# Patient Record
Sex: MALE | Race: BLACK OR AFRICAN AMERICAN
[De-identification: names, ages, dates, MRNs, and addresses within clinical notes are randomized per-mention and may not be internally consistent; named-entity substitution may affect disease eponyms.]

---

## 2019-10-01 ENCOUNTER — HOSPITAL ENCOUNTER (EMERGENCY)
Dept: HOSPITAL 97 - ER | Age: 29
Discharge: HOME | End: 2019-10-01
Payer: SELF-PAY

## 2019-10-01 VITALS — OXYGEN SATURATION: 96 % | DIASTOLIC BLOOD PRESSURE: 67 MMHG | SYSTOLIC BLOOD PRESSURE: 117 MMHG

## 2019-10-01 VITALS — TEMPERATURE: 97.7 F

## 2019-10-01 DIAGNOSIS — K52.9: Primary | ICD-10-CM

## 2019-10-01 DIAGNOSIS — F17.210: ICD-10-CM

## 2019-10-01 DIAGNOSIS — G40.909: ICD-10-CM

## 2019-10-01 LAB
ALBUMIN SERPL BCP-MCNC: 4 G/DL (ref 3.4–5)
ALP SERPL-CCNC: 58 U/L (ref 45–117)
ALT SERPL W P-5'-P-CCNC: 33 U/L (ref 12–78)
ANISOCYTOSIS BLD QL: (no result)
AST SERPL W P-5'-P-CCNC: 34 U/L (ref 15–37)
BLD SMEAR INTERP: (no result)
BUN BLD-MCNC: 17 MG/DL (ref 7–18)
GIANT PLATELETS BLD QL SMEAR: SLIGHT
GLUCOSE SERPLBLD-MCNC: 125 MG/DL (ref 74–106)
HCT VFR BLD CALC: 42 % (ref 39.6–49)
LIPASE SERPL-CCNC: 144 U/L (ref 73–393)
LYMPHOCYTES # SPEC AUTO: 0.5 K/UL (ref 0.7–4.9)
MORPHOLOGY BLD-IMP: (no result)
PMV BLD: 8.6 FL (ref 7.6–11.3)
POTASSIUM SERPL-SCNC: 3.5 MMOL/L (ref 3.5–5.1)
RBC # BLD: 5.15 M/UL (ref 4.33–5.43)

## 2019-10-01 PROCEDURE — 80048 BASIC METABOLIC PNL TOTAL CA: CPT

## 2019-10-01 PROCEDURE — 80076 HEPATIC FUNCTION PANEL: CPT

## 2019-10-01 PROCEDURE — 96375 TX/PRO/DX INJ NEW DRUG ADDON: CPT

## 2019-10-01 PROCEDURE — 96361 HYDRATE IV INFUSION ADD-ON: CPT

## 2019-10-01 PROCEDURE — 99284 EMERGENCY DEPT VISIT MOD MDM: CPT

## 2019-10-01 PROCEDURE — 83690 ASSAY OF LIPASE: CPT

## 2019-10-01 PROCEDURE — 36415 COLL VENOUS BLD VENIPUNCTURE: CPT

## 2019-10-01 PROCEDURE — 96374 THER/PROPH/DIAG INJ IV PUSH: CPT

## 2019-10-01 PROCEDURE — 85025 COMPLETE CBC W/AUTO DIFF WBC: CPT

## 2019-10-01 NOTE — ER
Nurse's Notes                                                                                     

 [facility]                                                                 

Name: Javier Griffin                                                                               

Age: 29 yrs                                                                                       

Sex: Male                                                                                         

: 1990                                                                                   

MRN: [id number]                                                                                   

Arrival Date: 10/01/2019                                                                          

Time: 06:44                                                                                       

Account#: [id number]                                                                            

Bed 5                                                                                             

Private MD:                                                                                       

Diagnosis: Generalized abdominal pain;Gastroenteritis                                             

                                                                                                  

Presentation:                                                                                     

10/01                                                                                             

06:47 Presenting complaint: Patient states: vomiting and Diarrhea since  last night.      ak1 

      Transition of care: patient was not received from another setting of care. Onset of         

      symptoms was 2019. Risk Assessment: Do you want to hurt yourself or           

      someone else? Patient reports no desire to harm self or others. Initial Sepsis Screen:      

      Does the patient meet any 2 criteria? No. Patient's initial sepsis screen is negative.      

      Does the patient have a suspected source of infection? No. Patient's initial sepsis         

      screen is negative. Care prior to arrival: None.                                            

06:47 Method Of Arrival: EMS: [facility]                                                       ak1 

06:47 Acuity: PATRICIA 3                                                                           ak1 

                                                                                                  

Triage Assessment:                                                                                

06:48 General: Appears in no apparent distress. Behavior is drowsy. Pain: Complains of pain   ak1 

      in abdomen. GI: Reports diarrhea, nausea, vomiting.                                         

06:54 GI: Pt is actively vomiting.                                                            lp1 

                                                                                                  

Historical:                                                                                       

- Allergies:                                                                                      

06:48 No Known Allergies;                                                                     ak1 

- Home Meds:                                                                                      

06:48 Depakote Oral [Active];                                                                 ak1 

- PMHx:                                                                                           

06:48 Seizures; Asthma;                                                                       ak1 

- PSHx:                                                                                           

06:48 None;                                                                                   ak1 

                                                                                                  

- Immunization history:: Adult Immunizations unknown.                                             

- Social history:: Smoking status: Patient uses tobacco products, smokes one-half pack            

  cigarettes per day, Patient uses alcohol, Patient/guardian denies using street drugs.           

- Ebola Screening: : No symptoms or risks identified at this time.                                

                                                                                                  

                                                                                                  

Screenin:49 Abuse screen: Denies threats or abuse. Denies injuries from another. Nutritional        ak1 

      screening: No deficits noted. Tuberculosis screening: No symptoms or risk factors           

      identified. Fall Risk None identified.                                                      

                                                                                                  

Assessment:                                                                                       

07:18 General: Appears in no apparent distress. Behavior is calm, appropriate for age. Pain:  tw2 

      Complains of pain in abdomen. Neuro: Level of Consciousness is awake, alert, obeys          

      commands, Oriented to person, place, time, situation. Cardiovascular: Heart tones S1 S2     

      Patient's skin is warm and dry. Respiratory: Airway is patent Respiratory effort is         

      even, unlabored, Respiratory pattern is regular, symmetrical, Breath sounds are clear       

      bilaterally. GI: Abdomen is flat, Bowel sounds present X 4 quads. Reports nausea,           

      vomiting. : No signs and/or symptoms were reported regarding the genitourinary            

      system. EENT: No signs and/or symptoms were reported regarding the EENT system. Derm:       

      No signs and/or symptoms reported regarding the dermatologic system. Musculoskeletal:       

      Range of motion: intact in all extremities.                                                 

08:42 Reassessment: Patient appears in no apparent distress at this time. No changes from     tw2 

      previously documented assessment. Patient and/or family updated on plan of care and         

      expected duration. Pain level reassessed. Patient is alert, oriented x 3, equal             

      unlabored respirations, skin warm/dry/pink. Patient states symptoms have improved.          

                                                                                                  

Vital Signs:                                                                                      

06:46  / 64; Pulse 54; Resp 16; Temp 97.7(TE); Pulse Ox 97% on R/A; Weight 79.38 kg     ak1 

      (R); Height 5 ft. 9 in. (175.26 cm) (R); Pain 4/10;                                         

07:39  / 74; Pulse 57; Resp 17; Pulse Ox 99% on R/A;                                    tw2 

08:42  / 67; Pulse 58; Resp 17; Pulse Ox 96% on R/A;                                    tw2 

06:46 Body Mass Index 25.84 (79.38 kg, 175.26 cm)                                             ak1 

                                                                                                  

ED Course:                                                                                        

06:44 Patient arrived in ED.                                                                  ds1 

06:46 Arm band placed on Patient placed in an exam room, on a stretcher, on pulse oximetry,   ak1 

      Patient notified of wait time.                                                              

06:47 Triage completed.                                                                       ak1 

06:48 Luther Rios PA is PHCP.                                                               [id number] 

06:48 Rafael Capps MD is Attending Physician.                                             [id number] 

06:49 Patient has correct armband on for positive identification. Bed in low position. Call   ak1 

      light in reach. Side rails up X2. Pulse ox on. NIBP on.                                     

06:50 Inserted saline lock: 20 gauge in right antecubital area, using aseptic technique.      lp1 

      Blood collected.                                                                            

07:06 Tanisha Dick RN is Primary Nurse.                                                        tw2 

07:13 Inserted saline lock: 22 gauge in right forearm, using aseptic technique. Blood         tw2 

      collected.                                                                                  

08:42 No provider procedures requiring assistance completed.                                  tw2 

08:47 IV discontinued, intact, bleeding controlled, No redness/swelling at site. Pressure     tw2 

      dressing applied, x2.                                                                       

                                                                                                  

Administered Medications:                                                                         

06:57 Drug: Zofran 4 mg Route: IVP; Site: right antecubital;                                  lp1 

07:45 Follow up: Response: No adverse reaction; Nausea unchanged                              tw2 

06:58 Drug: NS 0.9% 1000 ml Route: IV; Rate: 1000 ml; Site: right antecubital;                lp1 

07:55 Follow up: Response: No adverse reaction; IV Status: Completed infusion; IV Intake:     tw2 

      1000ml                                                                                      

07:47 Drug: Phenergan 12.5 mg Route: IVP; Site: right antecubital;                            tw2 

08:45 Follow up: Response: No adverse reaction                                                tw2 

08:23 Drug: fentaNYL (PF) 50 mcg Route: IVP; Site: right antecubital;                         sg  

08:45 Follow up: Response: No adverse reaction; Pain is decreased; RASS: Drowsy (-1)          tw2 

                                                                                                  

                                                                                                  

Intake:                                                                                           

07:55 IV: 1000ml; Total: 1000ml.                                                              tw2 

                                                                                                  

Outcome:                                                                                          

08:38 Discharge ordered by MD.                                                                judy 

08:47 Discharged to home ambulatory, with family.                                             tw2 

08:47 Condition: stable                                                                           

08:47 Discharge instructions given to patient, family, Instructed on discharge instructions,      

      follow up and referral plans. no drinking with medication, no driving heavy equipment,      

      medication usage, Demonstrated understanding of instructions, follow-up care,               

      medications, Prescriptions given X 2.                                                       

08:47 Patient left the ED.                                                                    tw2 

                                                                                                  

Signatures:                                                                                       

Steve Moreira RN                         Dee Jay                                ds1                                                  

Hallie Milan RN                         RN   lp1                                                  

Luther Rios PA PA   jr8                                                  

Pretty Mathews RN                       RN   ak1                                                  

Tanisha Dick RN                          RN   tw2                                                  

                                                                                                  

**************************************************************************************************

## 2022-05-13 ENCOUNTER — HOSPITAL ENCOUNTER (EMERGENCY)
Dept: HOSPITAL 97 - ER | Age: 32
Discharge: HOME | End: 2022-05-13
Payer: SELF-PAY

## 2022-05-13 DIAGNOSIS — D64.9: ICD-10-CM

## 2022-05-13 DIAGNOSIS — Z71.1: Primary | ICD-10-CM

## 2022-05-13 DIAGNOSIS — F17.210: ICD-10-CM

## 2022-05-13 DIAGNOSIS — F31.9: ICD-10-CM

## 2022-05-13 LAB
ALBUMIN SERPL BCP-MCNC: 3.4 G/DL (ref 3.4–5)
ALP SERPL-CCNC: 56 U/L (ref 45–117)
ALT SERPL W P-5'-P-CCNC: 20 U/L (ref 12–78)
AST SERPL W P-5'-P-CCNC: 19 U/L (ref 15–37)
BUN BLD-MCNC: 11 MG/DL (ref 7–18)
GLUCOSE SERPLBLD-MCNC: 106 MG/DL (ref 74–106)
HCT VFR BLD CALC: 38.9 % (ref 39.6–49)
LYMPHOCYTES # SPEC AUTO: 2.3 K/UL (ref 0.7–4.9)
PMV BLD: 7.6 FL (ref 7.6–11.3)
POTASSIUM SERPL-SCNC: 3.9 MMOL/L (ref 3.5–5.1)
RBC # BLD: 4.7 M/UL (ref 4.33–5.43)

## 2022-05-13 PROCEDURE — 85025 COMPLETE CBC W/AUTO DIFF WBC: CPT

## 2022-05-13 PROCEDURE — 80053 COMPREHEN METABOLIC PANEL: CPT

## 2022-05-13 PROCEDURE — 36415 COLL VENOUS BLD VENIPUNCTURE: CPT

## 2022-05-13 PROCEDURE — 99283 EMERGENCY DEPT VISIT LOW MDM: CPT

## 2022-05-13 NOTE — EDPHYS
Physician Documentation                                                                           

 [facility]                                                                 

Name: Javier Griffin                                                                               

Age: 32 yrs                                                                                       

Sex: Male                                                                                         

: 1990                                                                                   

MRN: [id number]                                                                                   

Arrival Date: 2022                                                                          

Time: 20:34                                                                                       

Account#: [id number]                                                                            

Bed 16                                                                                            

Private MD:                                                                                       

ED Physician Hernan Christianson                                                                         

HPI:                                                                                              

                                                                                             

22:10 This 32 yrs old Black Male presents to ER via Ambulatory with complaints of Eye         ms3 

      Problem, Yellowing of eyes.                                                                 

22:10 The patient is experiencing yellow eyes. Onset: The symptoms/episode began/occurred     ms3 

      today. Duration: the symptoms are continuous. Aggravated by nothing. Alleviated by          

      nothing. Associated signs and symptoms: Pertinent negatives: chills, dizziness, fever,      

      headache.                                                                                   

                                                                                                  

Historical:                                                                                       

- Allergies:                                                                                      

20:57 No Known Allergies;                                                                     vc1 

- Home Meds:                                                                                      

20:57 Depakote Oral [Active]; Trazodone Oral [Active]; Wellbutrin Oral [Active]; Risperdal    vc1 

      0.25 mg Oral tab 2 tabs 2 times per day [Active]; Seroquel 25 mg Oral tab [Active];         

- PMHx:                                                                                           

20:57 Asthma; Seizures; Bipolar disorder;                                                     vc1 

- PSHx:                                                                                           

20:57 None;                                                                                   vc1 

                                                                                                  

- Immunization history:: Adult Immunizations up to date.                                          

- Social history:: Smoking status: Patient reports the use of cigarette tobacco                   

  products, smokes one-half pack cigarettes per day, Patient uses alcohol, on a daily             

  basis.                                                                                          

                                                                                                  

                                                                                                  

ROS:                                                                                              

22:10 Constitutional: Negative for fever, and chills. Neck: Negative for injury, pain, and    ms3 

      swelling, Cardiovascular: Negative for chest pain, and palpitations. Respiratory:           

      Negative for shortness of breath, cough, wheezing, and pleuritic chest pain,                

      Abdomen/GI: Negative for abdominal pain, nausea, vomiting, diarrhea, and constipation,      

      MS/Extremity: Negative for injury and deformity, Skin: Negative for injury, rash, and       

      discoloration, Psych: Negative for depression, anxiety, suicide ideation, homicidal         

      ideation, and hallucinations.                                                               

22:10 Eyes: Positive for icterus.                                                             ms3 

                                                                                                  

Exam:                                                                                             

22:10 Constitutional:  This is a well developed, well nourished patient who is awake, alert,  ms3 

      and in no acute distress. Head/Face:  Normocephalic, atraumatic. Eyes:  Pupils equal        

      round and reactive to light, extra-ocular motions intact.  Lids and lashes normal.          

      Conjunctiva and sclera are non-icteric and not injected.  Periorbital areas with no         

      swelling, redness, or edema. Neck:  Trachea midline, no cervical lymphadenopathy.           

      Supple, full range of motion without nuchal rigidity, or vertebral point tenderness.        

      No Meningismus. Chest/axilla:  Normal chest wall appearance and motion.  Nontender with     

      no deformity.   Cardiovascular:  Regular rate and rhythm with a normal S1 and S2.  No       

      gallops, murmurs, or rubs.  Normal PMI, no JVD.  No pulse deficits. Respiratory:  Lungs     

      have equal breath sounds bilaterally, clear to auscultation and percussion.  No rales,      

      rhonchi or wheezes noted.  No increased work of breathing, no retractions or nasal          

      flaring. Abdomen/GI:  Soft, non-tender, with normal bowel sounds.  No distension or         

      tympany.  No guarding or rebound.  No evidence of tenderness throughout. Skin:  Warm,       

      dry with normal turgor.  Normal color with no rashes, no lesions, and no evidence of        

      cellulitis. Psych:  Awake, alert, with orientation to person, place and time.               

      Behavior, mood, and affect are within normal limits.                                        

                                                                                                  

Vital Signs:                                                                                      

20:59  / 81; Pulse 81; Resp 16; Temp 98.8; Pulse Ox 99% ; Weight 65.77 kg; Height 5 ft. vc1 

      9 in. (175.26 cm); Pain 10/10;                                                              

20:59 Body Mass Index 21.41 (65.77 kg, 175.26 cm)                                             vc1 

                                                                                                  

MDM:                                                                                              

21:22 Patient medically screened.                                                             ms3 

22:10 Differential diagnosis: hepatitis vs bilirubinemia. Data reviewed: vital signs, nurses  ms3 

      notes, lab test result(s). Counseling: I had a detailed discussion with the patient         

      and/or guardian regarding: the historical points, exam findings, and any diagnostic         

      results supporting the discharge/admit diagnosis, lab results, the need for outpatient      

      follow up, to return to the emergency department if symptoms worsen or persist or if        

      there are any questions or concerns that arise at home.                                     

                                                                                                  

                                                                                             

20:53 Order name: CBC with Diff; Complete Time: 22:07                                         ms3 

                                                                                             

20:53 Order name: CMP; Complete Time: 22:07                                                   ms3 

                                                                                                  

Administered Medications:                                                                         

No medications were administered                                                                  

                                                                                                  

                                                                                                  

Disposition Summary:                                                                              

22 22:09                                                                                    

Discharge Ordered                                                                                 

      Location: Home                                                                          ms3 

      Condition: Stable                                                                       ms3 

      Diagnosis                                                                                   

        - Person with feared health complaint in whom no diagnosis is made                    ms3 

        - Anemia, unspecified                                                                 ms3 

      Followup:                                                                               ms3 

        - With: Catarino Martins MD                                                                 

        - When: 2 - 3 days                                                                         

        - Reason: Recheck today's complaints                                                       

      Followup:                                                                               ms3 

        - With: Sen Ramos MD                                                                

        - When: 2 - 3 days                                                                         

        - Reason: Recheck today's complaints                                                       

      Discharge Instructions:                                                                     

        - Discharge Summary Sheet                                                             ms3 

        - Anemia                                                                              ms3 

      Forms:                                                                                      

        - Medication Reconciliation Form                                                      ms3 

        - Thank You Letter                                                                    ms3 

        - Antibiotic Education                                                                ms3 

        - Prescription Opioid Use                                                             ms3 

Signatures:                                                                                       

Dispatcher [employer]                           EDMS                                                 

Sarath Almeida, KATIEP-C                      FNP-Cla1                                                  

Hernan Christianson DO                        DO   ms3                                                  

Keesha Amin, RN                    RN   vc1                                                  

                                                                                                  

Corrections: (The following items were deleted from the chart)                                    

22:12 22:10 Constitutional: Negative for fever, and chills. Eyes: Negative for injury, pain,  ms3 

      redness, and discharge, Neck: Negative for injury, pain, and swelling, Cardiovascular:      

      Negative for chest pain, and palpitations. Respiratory: Negative for shortness of           

      breath, cough, wheezing, and pleuritic chest pain, Abdomen/GI: Negative for abdominal       

      pain, nausea, vomiting, diarrhea, and constipation, MS/Extremity: Negative for injury       

      and deformity, Skin: Negative for injury, rash, and discoloration, Psych: Negative for      

      depression, anxiety, suicide ideation, homicidal ideation, and hallucinations, ms3          

                                                                                                  

**************************************************************************************************

## 2022-05-13 NOTE — ER
Nurse's Notes                                                                                     

 [facility]                                                                 

Name: Javier Griffin                                                                               

Age: 32 yrs                                                                                       

Sex: Male                                                                                         

: 1990                                                                                   

MRN: [id number]                                                                                   

Arrival Date: 2022                                                                          

Time: 20:34                                                                                       

Account#: [id number]                                                                            

Bed 16                                                                                            

Private MD:                                                                                       

Diagnosis: Person with feared health complaint in whom no diagnosis is made;Anemia, unspecified   

                                                                                                  

Presentation:                                                                                     

                                                                                             

20:55 Chief complaint: Patient states: "My eyes are yellow, I've been loosing my hair, I have vc1 

      had stomach pain and coughing up blood last week with a nose bleed two nights ago.          

      Coronavirus screen: Vaccine status: Patient reports being unvaccinated. Ebola Screen:       

      No symptoms or risks identified at this time. Risk Assessment: Do you want to hurt          

      yourself or someone else? Patient reports no desire to harm self or others. Onset of        

      symptoms is unknown.                                                                        

20:55 Method Of Arrival: Ambulatory                                                           vc1 

20:55 Acuity: PATRICIA 3                                                                           vc1 

                                                                                                  

Historical:                                                                                       

- Allergies:                                                                                      

20:57 No Known Allergies;                                                                     vc1 

- Home Meds:                                                                                      

20:57 Depakote Oral [Active]; Trazodone Oral [Active]; Wellbutrin Oral [Active]; Risperdal    vc1 

      0.25 mg Oral tab 2 tabs 2 times per day [Active]; Seroquel 25 mg Oral tab [Active];         

- PMHx:                                                                                           

20:57 Asthma; Seizures; Bipolar disorder;                                                     vc1 

- PSHx:                                                                                           

20:57 None;                                                                                   vc1 

                                                                                                  

- Immunization history:: Adult Immunizations up to date.                                          

- Social history:: Smoking status: Patient reports the use of cigarette tobacco                   

  products, smokes one-half pack cigarettes per day, Patient uses alcohol, on a daily             

  basis.                                                                                          

                                                                                                  

                                                                                                  

Screenin:15 Abuse screen: Denies threats or abuse. Nutritional screening: No deficits noted.        jb4 

      Tuberculosis screening: No symptoms or risk factors identified. Fall Risk None              

      identified.                                                                                 

                                                                                                  

Assessment:                                                                                       

21:15 General: Appears in no apparent distress. comfortable, Behavior is calm, cooperative,   jb4 

      appropriate for age. Pain: Denies pain. Neuro: Level of Consciousness is awake, alert,      

      obeys commands, Oriented to person, place, time, situation. Cardiovascular: Patient's       

      skin is warm and dry. Respiratory: Airway is patent Respiratory effort is even,             

      unlabored, Respiratory pattern is regular, symmetrical. GI: No signs and/or symptoms        

      were reported involving the gastrointestinal system. : No signs and/or symptoms were      

      reported regarding the genitourinary system. EENT: Sclera/Cornea Jaundice discoloration     

      noted to both eyes. Derm: Skin is intact, Skin is dry, Skin is normal, Skin temperature     

      is warm. Musculoskeletal: Circulation, motion, and sensation intact. Range of motion:       

      intact in all extremities.                                                                  

22:11 Reassessment: Patient appears in no apparent distress at this time. Patient and/or      jb4 

      family updated on plan of care and expected duration. Pain level reassessed. Patient is     

      alert, oriented x 3, equal unlabored respirations, skin warm/dry/pink.                      

22:39 Reassessment: Patient appears in no apparent distress at this time. Patient and/or      jb4 

      family updated on plan of care and expected duration. Pain level reassessed. Patient is     

      alert, oriented x 3, equal unlabored respirations, skin warm/dry/pink.                      

                                                                                                  

Vital Signs:                                                                                      

20:59  / 81; Pulse 81; Resp 16; Temp 98.8; Pulse Ox 99% ; Weight 65.77 kg; Height 5 ft. vc1 

      9 in. (175.26 cm); Pain 10/10;                                                              

20:59 Body Mass Index 21.41 (65.77 kg, 175.26 cm)                                             vc1 

                                                                                                  

ED Course:                                                                                        

20:34 Patient arrived in ED.                                                                  bp1 

20:41 Hernan Christianson DO is Attending Physician.                                                ms3 

20:57 Triage completed.                                                                       vc1 

20:57 Arm band placed on left wrist.                                                          vc1 

21:15 Patient has correct armband on for positive identification. Bed in low position. Call   jb4 

      light in reach. Side rails up X 1. Client placed on continuous cardiac and pulse            

      oximetry monitoring. NIBP monitoring applied.                                               

21:29 Initial lab(s) drawn, by me, sent to lab. Inserted saline lock: 18 gauge in left        jb4 

      forearm, using aseptic technique. Blood collected.                                          

22:06 Salvador Arias, RN is Primary Nurse.                                                     jb4 

22:08 Catarino Martins MD is Referral Physician.                                               ms3 

22:09 Sen Ramos MD is Referral Physician.                                              ms3 

22:12 No provider procedures requiring assistance completed. IV discontinued, intact,         jb4 

      bleeding controlled, No redness/swelling at site. Pressure dressing applied.                

                                                                                                  

Administered Medications:                                                                         

No medications were administered                                                                  

                                                                                                  

                                                                                                  

Outcome:                                                                                          

22:09 Discharge ordered by MD.                                                                ms3 

22:39 Discharged to home ambulatory.                                                          jb4 

22:39 Condition: stable                                                                           

22:39 Discharge instructions given to patient, Instructed on discharge instructions, follow       

      up and referral plans. Demonstrated understanding of instructions, follow-up care.          

22:40 Patient left the ED.                                                                    jb4 

                                                                                                  

Signatures:                                                                                       

Salvador Arias, RN                       RN   jb4                                                  

Hernan Christianson DO DO   ms3                                                  

Prachi Hanna Vanessa, RN                    RN   vc1                                                  

                                                                                                  

**************************************************************************************************

## 2022-05-14 VITALS — OXYGEN SATURATION: 99 % | SYSTOLIC BLOOD PRESSURE: 123 MMHG | DIASTOLIC BLOOD PRESSURE: 81 MMHG | TEMPERATURE: 98.8 F

## 2022-05-23 ENCOUNTER — HOSPITAL ENCOUNTER (EMERGENCY)
Dept: HOSPITAL 97 - ER | Age: 32
Discharge: HOME | End: 2022-05-23
Payer: SELF-PAY

## 2022-05-23 VITALS — TEMPERATURE: 99 F | SYSTOLIC BLOOD PRESSURE: 130 MMHG | DIASTOLIC BLOOD PRESSURE: 69 MMHG | OXYGEN SATURATION: 100 %

## 2022-05-23 DIAGNOSIS — F60.9: Primary | ICD-10-CM

## 2022-05-23 DIAGNOSIS — F17.210: ICD-10-CM

## 2022-05-23 PROCEDURE — 99283 EMERGENCY DEPT VISIT LOW MDM: CPT

## 2022-05-23 NOTE — EDPHYS
Physician Documentation                                                                           

 [facility]                                                                 

Name: Javier Griffin                                                                               

Age: 32 yrs                                                                                       

Sex: Male                                                                                         

: 1990                                                                                   

MRN: [id number]                                                                                   

Arrival Date: 2022                                                                          

Time: 18:47                                                                                       

Account#: [id number]                                                                            

Bed 13                                                                                            

Private MD:                                                                                       

ED Physician Luciano Cedeno                                                                       

HPI:                                                                                              

                                                                                             

19:52 This 32 yrs old Black Male presents to ER via Ambulatory with complaints of Suicidal    kdr 

      Ideation, Neck Pain, <24hrs Old.                                                            

19:52 The patient presents to the emergency department with Attention seeking behavior.       kdr 

      Onset: The symptoms/episode began/occurred at an unknown time. This is an ongoing           

      problem patient states he is not acutely suicidal or homicidal. He states that he           

      exhibits attention seeking behavior from time to time. He denies wanting to leave his 2     

      wonderful children that he takes care of. He realizes that he has to be there for them.     

      He stated that he did have a serious suicidal thoughts and attempt about 5 years ago        

      been speaking with police at that time he is at peace with that circumstance and is         

      now, other than participating in attention seeking behavior, not a threat to himself or     

      others. The police officers were present during my interview with the patient and they      

      conducted their own interview as well. Conclusion of our discussion was that he was not     

      acutely suicidal or a threat to others.. Past psychiatric history: Prior diagnosis:         

      bipolar disorder. Associated signs and symptoms: The patient has no apparent associated     

      signs or symptoms. Severity of symptoms: At their worst the symptoms were very mild in      

      the emergency department the symptoms are unchanged have resolved. The patient has          

      experienced similar episodes in the past, chronically. The patient has not recently         

      seen a physician.                                                                           

                                                                                                  

Historical:                                                                                       

- Allergies:                                                                                      

18:57 No Known Allergies;                                                                     jd3 

- Home Meds:                                                                                      

18:57 Depakote Oral [Active]; Risperdal 0.25 mg Oral tab 2 tabs 2 times per day [Active];     jd3 

      Seroquel 25 mg Oral tab [Active]; Trazodone Oral [Active]; Wellbutrin Oral [Active];        

- PMHx:                                                                                           

18:57 Asthma; Bipolar disorder; Seizures;                                                     jd3 

                                                                                                  

- Immunization history:: Adult Immunizations up to date, Client reports having NOT                

  received the Covid vaccine. Flu vaccine status is unknown.                                      

- Social history:: Smoking status: Patient reports the use of cigarette tobacco                   

  products, smokes one-half pack cigarettes per day.                                              

                                                                                                  

                                                                                                  

ROS:                                                                                              

19:52 Constitutional: Negative for fever, chills, and weight loss, Eyes: Negative for injury, kdr 

      pain, redness, and discharge, ENT: Negative for injury, pain, and discharge, Neck:          

      Negative for injury, pain, and swelling, Cardiovascular: Negative for chest pain,           

      palpitations, and edema, Respiratory: Negative for shortness of breath, cough,              

      wheezing, and pleuritic chest pain, Abdomen/GI: Negative for abdominal pain, nausea,        

      vomiting, diarrhea, and constipation, Back: Negative for injury and pain, : Negative      

      for injury, bleeding, discharge, and swelling, MS/Extremity: Negative for injury and        

      deformity, Skin: Negative for injury, rash, and discoloration, Neuro: Negative for          

      headache, weakness, numbness, tingling, and seizure activity. Allergy/Immunology:           

      Negative for hives, rash, and allergies, Endocrine: Negative for neck swelling,             

      polydipsia, polyuria, polyphagia, and marked weight changes, Hematologic/Lymphatic:         

      Negative for swollen nodes, abnormal bleeding, and unusual bruising.                        

19:52 Psych: Positive for Bipolar disorder.                                                       

                                                                                                  

Exam:                                                                                             

19:52 Constitutional:  This is a well developed, well nourished patient who is awake, alert,  kdr 

      and in no acute distress. Head/Face:  Normocephalic, atraumatic. Eyes:  Pupils equal        

      round and reactive to light, extra-ocular motions intact.  Lids and lashes normal.          

      Conjunctiva and sclera are non-icteric and not injected.  Cornea within normal limits.      

      Periorbital areas with no swelling, redness, or edema. Neck:  Trachea midline, no           

      thyromegaly or masses palpated, and no cervical lymphadenopathy.  Supple, full range of     

      motion without nuchal rigidity, or vertebral point tenderness.  No Meningismus.             

      Chest/axilla:  Normal chest wall appearance and motion.  Nontender with no deformity.       

      No lesions are appreciated. Cardiovascular:  Regular rate and rhythm with a normal S1       

      and S2.  No gallops, murmurs, or rubs.  Normal PMI, no JVD.  No pulse deficits.             

      Respiratory:  Lungs have equal breath sounds bilaterally, clear to auscultation and         

      percussion.  No rales, rhonchi or wheezes noted.  No increased work of breathing, no        

      retractions or nasal flaring. Abdomen/GI:  Soft, non-tender, with normal bowel sounds.      

      No distension or tympany.  No guarding or rebound.  No evidence of tenderness               

      throughout. Back:  No spinal tenderness.  No costovertebral tenderness.  Full range of      

      motion. Skin:  Warm, dry with normal turgor.  Normal color with no rashes, no lesions,      

      and no evidence of cellulitis. MS/ Extremity:  Pulses equal, no cyanosis.                   

      Neurovascular intact.  Full, normal range of motion. Neuro:  Awake and alert, GCS 15,       

      oriented to person, place, time, and situation.  Cranial nerves II-XII grossly intact.      

      Motor strength 5/5 in all extremities.  Sensory grossly intact.  Cerebellar exam            

      normal.  Normal gait.                                                                       

19:52 Psych: Behavior/mood is pleasant, cooperative, Affect is calm, Oriented to person,          

      place, time, Patient has no thoughts/intents to harm self or others. Judgement /            

      Insight is normal. Delusions/hallucinations are not present.                                

                                                                                                  

Vital Signs:                                                                                      

18:58  / 69; Pulse 83; Resp 19 S; Temp 99.0(TE); Pulse Ox 100% on R/A; Weight 74.84 kg  jd3 

      (R); Height 5 ft. 9 in. (175.26 cm) (R); Pain 8/10;                                         

19:59  / 70; Pulse 86; Resp 18; Pulse Ox 100% on R/A;                                   ld1 

18:58 Body Mass Index 24.37 (74.84 kg, 175.26 cm)                                             jd3 

                                                                                                  

MDM:                                                                                              

18:59 Patient medically screened.                                                             ms3 

19:52 Data reviewed: vital signs, nurses notes, lab test result(s), radiologic studies.       kdr 

      Counseling: I had a detailed discussion with the patient and/or guardian regarding: the     

      historical points, exam findings, and any diagnostic results supporting the                 

      discharge/admit diagnosis, the need for outpatient follow up.                               

                                                                                                  

Administered Medications:                                                                         

No medications were administered                                                                  

                                                                                                  

                                                                                                  

Disposition:                                                                                      

19:21 Patient medically screened. Labs and CTA neck placed awaiting Dr Cedeno. Patient       ms3 

      attempted to leave and  police department called..                                        

                                                                                                  

Disposition Summary:                                                                              

22 19:50                                                                                    

Discharge Ordered                                                                                 

      Location: Home                                                                          kdr 

      Problem: an acute exacerbation                                                          kdr 

      Symptoms: are resolved                                                                  kdr 

      Condition: Stable                                                                       kdr 

      Diagnosis                                                                                   

        - Personality disorder, unspecified                                                   kdr 

      Followup:                                                                               kdr 

        - With: Private Physician                                                                  

        - When: 2 - 3 days                                                                         

        - Reason: If symptoms return, Further diagnostic work-up, Recheck today's complaints,      

      Continuance of care, Re-evaluation by your physician                                        

      Discharge Instructions:                                                                     

        - Discharge Summary Sheet                                                             kdr 

        - Managing Borderline Personality Disorder                                            kdr 

      Forms:                                                                                      

        - Medication Reconciliation Form                                                      kdr 

        - Thank You Letter                                                                    kdr 

Signatures:                                                                                       

Dispatcher [employer]                           Luciano Mora MD MD   kdr                                                  

Serjio Black RN                    RN   jd3                                                  

Hernan Christianson DO                        DO   ms3                                                  

                                                                                                  

Corrections: (The following items were deleted from the chart)                                    

19:58 18:58 EKG - Nurse/Tech ordered. ms3                                                     ld1 

19:58 18:58 IV Saline Lock ordered. ms3                                                       ld1 

19:58 18:58 Labs collected and sent ordered. ms3                                              ld1 

19:58 18:58 Oxygen Per Protocol ordered. ms3                                                  ld1 

19:58 18:58 O2 Sat Monitoring ordered. ms3                                                    ld1 

19:58 18:58 Suicide Screening ([place]) ordered. ms3                                         ld1 

19:58 18:58 Urine Dipstick-Ancillary ordered. ms3                                             ld1 

                                                                                                  

**************************************************************************************************

## 2022-05-23 NOTE — ER
Nurse's Notes                                                                                     

 [facility]                                                                 

Name: Javier Griffin                                                                               

Age: 32 yrs                                                                                       

Sex: Male                                                                                         

: 1990                                                                                   

MRN: [id number]                                                                                   

Arrival Date: 2022                                                                          

Time: 18:47                                                                                       

Account#: [id number]                                                                            

Bed 13                                                                                            

Private MD:                                                                                       

Diagnosis: Personality disorder, unspecified                                                      

                                                                                                  

Presentation:                                                                                     

                                                                                             

18:54 Chief complaint: Patient states: "I tried hanging myself today about 20 min ago. I am   jd3 

      glad it didn't work, but now that I am down from the tree my neck and back are hurting      

      pretty bad.". Coronavirus screen: At this time, the client does not indicate any            

      symptoms associated with coronavirus-19. Ebola Screen: No symptoms or risks identified      

      at this time. Initial Sepsis Screen: Does the patient meet any 2 criteria? No.              

      Patient's initial sepsis screen is negative. Does the patient have a suspected source       

      of infection? No. Patient's initial sepsis screen is negative. Risk Assessment: Do you      

      want to hurt yourself or someone else? Patient reports desire/thoughts of hurting           

      themselves or someone else. Provider notified. Onset of symptoms was May 23, 2022.          

18:54 Method Of Arrival: Ambulatory                                                           jd3 

18:54 Acuity: PATRICIA 2                                                                           jd3 

                                                                                                  

Historical:                                                                                       

- Allergies:                                                                                      

18:57 No Known Allergies;                                                                     jd3 

- Home Meds:                                                                                      

18:57 Depakote Oral [Active]; Risperdal 0.25 mg Oral tab 2 tabs 2 times per day [Active];     jd3 

      Seroquel 25 mg Oral tab [Active]; Trazodone Oral [Active]; Wellbutrin Oral [Active];        

- PMHx:                                                                                           

18:57 Asthma; Bipolar disorder; Seizures;                                                     jd3 

                                                                                                  

- Immunization history:: Adult Immunizations up to date, Client reports having NOT                

  received the Covid vaccine. Flu vaccine status is unknown.                                      

- Social history:: Smoking status: Patient reports the use of cigarette tobacco                   

  products, smokes one-half pack cigarettes per day.                                              

                                                                                                  

                                                                                                  

Screenin:59 Abuse screen: Denies threats or abuse. Denies injuries from another. Nutritional        ld1 

      screening: No deficits noted. Tuberculosis screening: No symptoms or risk factors           

      identified. Fall Risk None identified.                                                      

                                                                                                  

Assessment:                                                                                       

19:57 Reassessment: PT refusing to all testing. Denies SI. ERP and PD at bedside discussing   ld1 

      POC.                                                                                        

19:59 General: Appears in no apparent distress. comfortable, Behavior is calm, cooperative,   ld1 

      appropriate for age. Pain: Denies pain. Neuro: Level of Consciousness is awake, alert,      

      obeys commands, Oriented to person, place, time, situation. Cardiovascular: Capillary       

      refill < 3 seconds Patient's skin is warm and dry. Respiratory: Airway is patent            

      Respiratory effort is even, unlabored, Respiratory pattern is regular, symmetrical. GI:     

      Abdomen is flat, non-distended. : No signs and/or symptoms were reported regarding        

      the genitourinary system. EENT: No signs and/or symptoms were reported regarding the        

      EENT system. Derm: No signs and/or symptoms reported regarding the dermatologic system.     

      Musculoskeletal: No signs and/or symptoms reported regarding the musculoskeletal system.    

                                                                                                  

Vital Signs:                                                                                      

18:58  / 69; Pulse 83; Resp 19 S; Temp 99.0(TE); Pulse Ox 100% on R/A; Weight 74.84 kg  jd3 

      (R); Height 5 ft. 9 in. (175.26 cm) (R); Pain 8/10;                                         

19:59  / 70; Pulse 86; Resp 18; Pulse Ox 100% on R/A;                                   ld1 

18:58 Body Mass Index 24.37 (74.84 kg, 175.26 cm)                                             jd3 

                                                                                                  

ED Course:                                                                                        

18:47 Patient arrived in ED.                                                                  as  

18:57 Triage completed.                                                                       jd3 

18:57 Hernan Christianson DO is Attending Physician.                                                ms3 

18:58 Arm band placed on.                                                                     jd3 

19:18 Attending Physician role handed off by Hernan Christianson DO                                 kdr 

19:18 Luciano Cedeno MD is Attending Physician.                                              kdr 

19:19 Safety checks: Items removed: yes. Door open/sign placed on door: yes. Family/friend    mh5 

      present: no. Sitter present: Yes.                                                           

19:57 Teagan Monterroso, CRISTOBAL is Primary Nurse.                                                   ld1 

19:59 Patient has correct armband on for positive identification. Pulse ox on. NIBP on.       ld1 

19:59 No provider procedures requiring assistance completed. Patient did not have IV access   ld1 

      during this emergency room visit.                                                           

                                                                                                  

Administered Medications:                                                                         

No medications were administered                                                                  

                                                                                                  

                                                                                                  

Medication:                                                                                       

19:59 VIS not applicable for this client.                                                     ld1 

                                                                                                  

Outcome:                                                                                          

19:50 Discharge ordered by MD.                                                                kdr 

19:59 Patient left the ED.                                                                    ld1 

19:59 Discharged to home ambulatory.                                                          ld1 

19:59 Condition: stable                                                                           

19:59 Discharge instructions given to patient, Instructed on discharge instructions, follow       

      up and referral plans. Demonstrated understanding of instructions, follow-up care.          

                                                                                                  

Signatures:                                                                                       

Luciano Cedeno MD MD kdr Martinez, Nannette Noel                              mh5                                                  

Serjio Black RN                    RN   jd3                                                  

Hernan Christianson DO DO   ms3                                                  

Teagan Monterroso RN                     RN   ld1                                                  

                                                                                                  

Corrections: (The following items were deleted from the chart)                                    

19:58 19:57 Reassessment: PT refusing to all testing. Denies SI. ERP at bedside discussing    ld1 

      POC. ld1                                                                                    

                                                                                                  

**************************************************************************************************

## 2022-07-01 ENCOUNTER — HOSPITAL ENCOUNTER (EMERGENCY)
Dept: HOSPITAL 97 - ER | Age: 32
Discharge: LEFT BEFORE DISCHARGE | End: 2022-07-01
Payer: SELF-PAY

## 2022-07-01 DIAGNOSIS — Z53.21: Primary | ICD-10-CM

## 2022-07-01 PROCEDURE — 99282 EMERGENCY DEPT VISIT SF MDM: CPT

## 2022-07-01 NOTE — ER
Nurse's Notes                                                                                     

 [facility]                                                                 

Name: Javier Griffin                                                                               

Age: 32 yrs                                                                                       

Sex: Male                                                                                         

: 1990                                                                                   

MRN: [id number]                                                                                   

Arrival Date: 2022                                                                          

Time: 21:17                                                                                       

Account#: [id number]                                                                            

Bed Waiting                                                                                       

Private MD:                                                                                       

Diagnosis:                                                                                        

                                                                                                  

Presentation:                                                                                     

                                                                                             

21:18 Chief complaint: EMS states: Called for patient with puncture like wound to R FA after  lp1 

      climbing over fence; Wound site dressed by EMS, no uncontrolled bleeding.                   

21:18 Method Of Arrival: EMS: [facility]                                                       1 

21:20 Note Patient appears upset, agitated while being registered by ED staff, walked out of  [facility] 

      ED lobby off of EMS stretcher.                                                              

                                                                                                  

ED Course:                                                                                        

21:17 Patient arrived in ED.                                                                  ag3 

                                                                                                  

Administered Medications:                                                                         

No medications were administered                                                                  

                                                                                                  

                                                                                                  

Outcome:                                                                                          

21:21 Patient left the ED.                                                                    1 

                                                                                                  

Signatures:                                                                                       

Hallie Milan RN                         RN   1                                                  

Deirdre Sloan                                 ag3                                                  

                                                                                                  

**************************************************************************************************

## 2023-05-04 ENCOUNTER — HOSPITAL ENCOUNTER (EMERGENCY)
Dept: HOSPITAL 97 - ER | Age: 33
Discharge: HOME | End: 2023-05-04
Payer: SELF-PAY

## 2023-05-04 VITALS — SYSTOLIC BLOOD PRESSURE: 109 MMHG | DIASTOLIC BLOOD PRESSURE: 84 MMHG

## 2023-05-04 VITALS — TEMPERATURE: 98.8 F

## 2023-05-04 VITALS — OXYGEN SATURATION: 96 %

## 2023-05-04 DIAGNOSIS — G40.509: Primary | ICD-10-CM

## 2023-05-04 DIAGNOSIS — R45.1: ICD-10-CM

## 2023-05-04 LAB
ALBUMIN SERPL BCP-MCNC: 4 G/DL (ref 3.4–5)
ALP SERPL-CCNC: 62 U/L (ref 45–117)
ALT SERPL W P-5'-P-CCNC: 19 U/L (ref 16–61)
AST SERPL W P-5'-P-CCNC: 16 U/L (ref 15–37)
BUN BLD-MCNC: 13 MG/DL (ref 7–18)
GLUCOSE SERPLBLD-MCNC: 125 MG/DL (ref 74–106)
HCT VFR BLD CALC: 39 % (ref 39.6–49)
INR BLD: 1.05
LYMPHOCYTES # SPEC AUTO: 1.5 K/UL (ref 0.7–4.9)
MCV RBC: 81.4 FL (ref 80–100)
PMV BLD: 7.7 FL (ref 7.6–11.3)
POTASSIUM SERPL-SCNC: 4.4 MEQ/L (ref 3.5–5.1)
RBC # BLD: 4.8 M/UL (ref 4.33–5.43)
VALPROATE SERPL-MCNC: 4.5 MCG/ML (ref 50–100)

## 2023-05-04 PROCEDURE — 85730 THROMBOPLASTIN TIME PARTIAL: CPT

## 2023-05-04 PROCEDURE — 36415 COLL VENOUS BLD VENIPUNCTURE: CPT

## 2023-05-04 PROCEDURE — 93005 ELECTROCARDIOGRAM TRACING: CPT

## 2023-05-04 PROCEDURE — 80048 BASIC METABOLIC PNL TOTAL CA: CPT

## 2023-05-04 PROCEDURE — 85025 COMPLETE CBC W/AUTO DIFF WBC: CPT

## 2023-05-04 PROCEDURE — 96360 HYDRATION IV INFUSION INIT: CPT

## 2023-05-04 PROCEDURE — 80076 HEPATIC FUNCTION PANEL: CPT

## 2023-05-04 PROCEDURE — 99285 EMERGENCY DEPT VISIT HI MDM: CPT

## 2023-05-04 PROCEDURE — 96372 THER/PROPH/DIAG INJ SC/IM: CPT

## 2023-05-04 PROCEDURE — 85610 PROTHROMBIN TIME: CPT

## 2023-05-04 PROCEDURE — 80164 ASSAY DIPROPYLACETIC ACD TOT: CPT

## 2023-05-04 NOTE — EDPHYS
Physician Documentation                                                                           

 [facility]                                                                 

Name: Javier Griffin                                                                               

Age: 33 yrs                                                                                       

Sex: Male                                                                                         

: 1990                                                                                   

MRN: [id number]                                                                                   

Arrival Date: 2023                                                                          

Time: 06:58                                                                                       

Account#: [id number]                                                                            

Bed 4                                                                                             

Private MD:                                                                                       

ED Physician Yung Olson                                                                         

HPI:                                                                                              

                                                                                             

08:11 This 33 yrs old Black Male presents to ER via EMS with complaints of Probable Seizure.  rn  

08:11 The patient presents after having a single isolated seizure. Seizure onset: just prior  rn  

      to arrival. Associated injury: The patient did not suffer any apparent associated           

      injury. Current symptoms: agitation.                                                        

08:11 The patient has experienced similar episodes in the past. EMS reports possible seizure, rn  

      hx of seizures, has not taken his depakote in unknown length of time. Patient denies        

      feeling ill, reports is upset. Denies overdose. No chest pain/sob/headache/focal neuro      

      complaint. Pt brought in by police as well, restrained. .                                   

                                                                                                  

Historical:                                                                                       

- Allergies:                                                                                      

07:01 Unable to obtain;                                                                       aa9 

- Home Meds:                                                                                      

07:01 Depakote Oral [Active]; Risperdal 0.25 mg Oral tab 2 tabs 2 times per day [Active];     aa9 

      Seroquel 25 mg Oral tab [Active]; Trazodone Oral [Active]; Wellbutrin Oral [Active];        

- PMHx:                                                                                           

07:01 Asthma; Bipolar disorder; Seizures;                                                     aa9 

- PSHx:                                                                                           

07:01 Unable to Obtain;                                                                       aa9 

                                                                                                  

- Immunization history:: Adult Immunizations unknown.                                             

- Social history:: Smoking status: unknown.                                                       

- Family history:: not pertinent.                                                                 

- Hospitalizations: : No recent hospitalization is reported.                                      

                                                                                                  

                                                                                                  

ROS:                                                                                              

08:11 Constitutional: Negative for fever, chills, and weight loss, Eyes: Negative for injury, rn  

      pain, redness, and discharge, Neck: Negative for injury, pain, and swelling,                

      Cardiovascular: Negative for chest pain, palpitations, and edema, Respiratory: Negative     

      for shortness of breath, cough, wheezing, and pleuritic chest pain, Abdomen/GI:             

      Negative for abdominal pain, nausea, vomiting, diarrhea, and constipation, Back:            

      Negative for injury and pain, MS/Extremity: Negative for injury and deformity, Skin:        

      Negative for injury, rash, and discoloration, Neuro: Negative for headache, weakness,       

      numbness, tingling                                                                          

                                                                                                  

Exam:                                                                                             

08:11 Constitutional:  This is a well developed, well nourished patient who is awake, alert,  rn  

      and in no acute distress. Head/Face:  Normocephalic, atraumatic. Eyes:  Pupils equal        

      round and reactive to light, extra-ocular motions intact.  Neck:  NO midline tenderness     

      Cardiovascular:  Tachycardic, regular.  No pulse deficits. Respiratory:  No increased       

      work of breathing, no retractions or nasal flaring. Abdomen/GI:  Soft, non-tender Back:     

       No spinal tenderness.  No costovertebral tenderness.  Full range of motion. Skin:          

      Warm, dry with normal turgor.  Normal color with no rashes, no lesions, and no evidence     

      of cellulitis. MS/ Extremity:  Pulses equal, no cyanosis.  Neuro:  Awake and alert, GCS     

      15                                                                                          

09:04 ECG was reviewed by the Attending Physician.                                            rn  

                                                                                                  

Vital Signs:                                                                                      

07:04  / 65; Pulse 99; Resp 17 S; Temp 98.8(O); Pulse Ox 97% on R/A;                    aa9 

07:42 Pulse 110; Resp 21; Pulse Ox 99% on R/A;                                                ld1 

07:46  / 84;                                                                            ld1 

08:50 Pulse 105; Resp 18; Pulse Ox 96% on R/A;                                                ld1 

                                                                                                  

MDM:                                                                                              

06:59 Patient medically screened.                                                             rn  

09:12 Differential diagnosis: drug overdose, seizure. Data reviewed: vital signs, nurses      rn  

      notes, lab test result(s), EKG, and as a result, I will discharge patient. Counseling:      

      I had a detailed discussion with the patient and/or guardian regarding: the historical      

      points, exam findings, and any diagnostic results supporting the discharge/admit            

      diagnosis, lab results, the need for outpatient follow up, to return to the emergency       

      department if symptoms worsen or persist or if there are any questions or concerns that     

      arise at home. Response to treatment: the patient's symptoms have markedly improved         

      after treatment, and as a result, I will discharge patient. Special discussion: I           

      discussed with the patient/guardian in detail that at this point there is no indication     

      for admission to the hospital. It is understood, however, that if the symptoms persist      

      or worsen the patient needs to return immediately for re-evaluation.                        

09:53 ED course: Pt medically clear, no seizure here, stable vitals, in police custody and    rn  

      police officers plan on taking him to [facility] when ready. Will dc to [facility] in police        

      custody. Needs to continue his seizure medication and avoid drugs..                         

                                                                                                  

                                                                                             

07:23 Order name: Acetaminophen; Complete Time: 09:12                                         rn  

                                                                                             

07:23 Order name: Basic Metabolic Panel; Complete Time: 09:12                                 rn  

                                                                                             

07:23 Order name: CBC with Diff; Complete Time: 09:12                                         rn  

                                                                                             

07:23 Order name: Hepatic Function; Complete Time: 09:12                                      rn  

                                                                                             

07:23 Order name: PT-INR; Complete Time: 09:12                                                rn  

                                                                                             

07:23 Order name: Ptt, Activated; Complete Time: 09:12                                        rn  

                                                                                             

07:23 Order name: Salicylate; Complete Time: 09:12                                            rn  

                                                                                             

07:23 Order name: Depakote; Complete Time: 09:                                              rn  

                                                                                             

07:23 Order name: EKG; Complete Time: 07:25                                                   rn  

                                                                                             

07:23 Order name: EKG - Nurse/Tech; Complete Time: 07:                                      rn  

                                                                                             

07:23 Order name: IV Saline Lock; Complete Time: 07:                                        rn  

                                                                                             

07:23 Order name: Labs collected and sent; Complete Time: 07:42                               rn  

                                                                                                  

EC: Rate is 88 beats/min. Rhythm is regular. QRS Axis is Normal. RI interval is normal. QRS rn  

      interval is normal. QT interval is normal. No Q waves. T waves are Normal. No ST            

      changes noted. Clinical impression: NSR w/ Non-specific ST/T Changes. Interpreted by        

      me. Reviewed by me.                                                                         

                                                                                                  

Administered Medications:                                                                         

07:41 Drug: NS 0.9% IV 1000 ml Route: IV; Rate: 1000 ml; Site: right upper arm;               ld1 

09:00 Follow up: IV Status: Completed infusion                                                iw  

07:41 Drug: LORazepam IM 1 mg Route: IM; Site: left deltoid;                                  ld1 

08:00 Follow up: Response: No adverse reaction                                                iw  

                                                                                                  

                                                                                                  

Disposition Summary:                                                                              

23 09:58                                                                                    

Discharge Ordered                                                                                 

      Location: Home                                                                          rn  

      Problem: new                                                                            rn  

      Symptoms: have improved                                                                 rn  

      Condition: Stable                                                                       rn  

      Diagnosis                                                                                   

        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  

      epilepticus                                                                                 

        - Restlessness and agitation                                                          rn  

      Followup:                                                                               rn  

        - With: Private Physician                                                                  

        - When: As needed                                                                          

        - Reason: Recheck today's complaints, Re-evaluation by your physician                      

      Discharge Instructions:                                                                     

        - Discharge Summary Sheet                                                             rn  

        - Epilepsy                                                                            rn  

        - Seizure, Adult                                                                      rn  

        - Managing Anger, Adult                                                               rn  

      Forms:                                                                                      

        - Medication Reconciliation Form                                                      rn  

        - Thank You Letter                                                                    rn  

        - Antibiotic Education                                                                rn  

        - Prescription Opioid Use                                                             rn  

      Prescriptions:                                                                              

        - Depakote 500 mg Oral Tablet                                                              

            - take 1 tablet by ORAL route every 12 hours; 60 tablet; Refills: 0, Product      rn  

      Selection Permitted                                                                         

Signatures:                                                                                       

Dispatcher [employer]                           EDMS                                                 

Yung Olson MD MD rn Sims, Lauren RN                        RN   ld1                                                  

Susy Chan RN                       RN   aa9                                                  

Charlotte Mota RN   iw                                                   

                                                                                                  

Corrections: (The following items were deleted from the chart)                                    

09:10 07:25 ETHANOL+[id number] ordered. EDMS                                                   EDMS

                                                                                                  

**************************************************************************************************

## 2023-05-05 NOTE — EKG
Test Date:    2023-05-04               Test Time:    07:29:04

Technician:   ATUL BRUMFIELD                                   

                                                     

MEASUREMENT RESULTS:                                       

Intervals:                                           

Rate:         99                                     

CO:           136                                    

QRSD:         98                                     

QT:           342                                    

QTc:          438                                    

Axis:                                                

P:            18                                     

CO:           136                                    

QRS:          69                                     

T:            39                                     

                                                     

INTERPRETIVE STATEMENTS:                                       

                                                     

Normal sinus rhythm

Normal ECG

Compared to ECG 08/15/2016 17:46:06

Early repolarization no longer present



Electronically Signed On 05-05-23 05:48:49 CDT by Chente Mixon

## 2023-05-06 NOTE — EKG
Test Date:    2023-05-04               Test Time:    07:29:45

Technician:   ATUL BRUMFIELD                                   

                                                     

MEASUREMENT RESULTS:                                       

Intervals:                                           

Rate:         88                                     

NM:           176                                    

QRSD:         100                                    

QT:           350                                    

QTc:          423                                    

Axis:                                                

P:            33                                     

NM:           176                                    

QRS:          73                                     

T:            36                                     

                                                     

INTERPRETIVE STATEMENTS:                                       

                                                     

Normal sinus rhythm

Normal ECG

Compared to ECG 05/04/2023 07:29:04

No significant changes



Electronically Signed On 05-06-23 07:10:00 CDT by Chente Mixon